# Patient Record
Sex: FEMALE | Race: WHITE | NOT HISPANIC OR LATINO | Employment: FULL TIME | URBAN - METROPOLITAN AREA
[De-identification: names, ages, dates, MRNs, and addresses within clinical notes are randomized per-mention and may not be internally consistent; named-entity substitution may affect disease eponyms.]

---

## 2024-01-16 ENCOUNTER — OFFICE VISIT (OUTPATIENT)
Dept: PRIMARY CARE | Facility: CLINIC | Age: 59
End: 2024-01-16
Payer: COMMERCIAL

## 2024-01-16 VITALS
WEIGHT: 160 LBS | TEMPERATURE: 97.2 F | HEART RATE: 80 BPM | HEIGHT: 62 IN | SYSTOLIC BLOOD PRESSURE: 126 MMHG | DIASTOLIC BLOOD PRESSURE: 76 MMHG | BODY MASS INDEX: 29.44 KG/M2

## 2024-01-16 DIAGNOSIS — C67.9 MALIGNANT NEOPLASM OF URINARY BLADDER, UNSPECIFIED SITE (MULTI): ICD-10-CM

## 2024-01-16 DIAGNOSIS — M25.50 ARTHRALGIA, UNSPECIFIED JOINT: ICD-10-CM

## 2024-01-16 DIAGNOSIS — G47.33 OSA (OBSTRUCTIVE SLEEP APNEA): Primary | ICD-10-CM

## 2024-01-16 DIAGNOSIS — E66.3 OVERWEIGHT (BMI 25.0-29.9): ICD-10-CM

## 2024-01-16 DIAGNOSIS — R73.03 PREDIABETES: ICD-10-CM

## 2024-01-16 DIAGNOSIS — Z00.00 ROUTINE GENERAL MEDICAL EXAMINATION AT A HEALTH CARE FACILITY: ICD-10-CM

## 2024-01-16 DIAGNOSIS — Z13.6 SCREENING FOR HEART DISEASE: ICD-10-CM

## 2024-01-16 DIAGNOSIS — E78.5 HYPERLIPIDEMIA, UNSPECIFIED HYPERLIPIDEMIA TYPE: ICD-10-CM

## 2024-01-16 PROCEDURE — 99214 OFFICE O/P EST MOD 30 MIN: CPT | Performed by: FAMILY MEDICINE

## 2024-01-16 PROCEDURE — 99396 PREV VISIT EST AGE 40-64: CPT | Performed by: FAMILY MEDICINE

## 2024-01-16 ASSESSMENT — ENCOUNTER SYMPTOMS
CONSTIPATION: 0
DIFFICULTY URINATING: 0
POLYDIPSIA: 0
DYSURIA: 0
DIARRHEA: 0
POLYPHAGIA: 0
VOMITING: 0
BLOOD IN STOOL: 0
ABDOMINAL DISTENTION: 0
SLEEP DISTURBANCE: 0
MYALGIAS: 0
ABDOMINAL PAIN: 0
FATIGUE: 0
DYSPHORIC MOOD: 0
NAUSEA: 0
DIZZINESS: 0
HEADACHES: 0
SHORTNESS OF BREATH: 0
PALPITATIONS: 0

## 2024-01-16 NOTE — PROGRESS NOTES
"Subjective   Patient ID: Lynette Calle is a 58 y.o. female who presents for Annual Exam, and multiple issues.     HPI   LFTs/lipids/predm: due for recheck  DEJON: dxd 6 yrs ago. Due for recheck. Never followed up to obtain machine. Reports not restful sleep. \"Never dreams.\"  Bladder CA: due to see urology.   Joint pains: recurrent. Would like repeat RA testing. Pain mostly in feet b/l.   BMI: high. Has been working on diet. Not exercising.     Review of Systems   Constitutional:  Negative for fatigue.   HENT: Negative.     Eyes:  Negative for visual disturbance.   Respiratory:  Negative for shortness of breath.    Cardiovascular:  Negative for chest pain and palpitations.   Gastrointestinal:  Negative for abdominal distention, abdominal pain, blood in stool, constipation, diarrhea, nausea and vomiting.   Endocrine: Negative for cold intolerance, heat intolerance, polydipsia, polyphagia and polyuria.   Genitourinary:  Negative for difficulty urinating and dysuria.   Musculoskeletal:  Negative for myalgias.   Skin:  Negative for rash.   Neurological:  Negative for dizziness and headaches.   Psychiatric/Behavioral:  Negative for dysphoric mood and sleep disturbance.        Objective   /76   Pulse 80   Temp 36.2 °C (97.2 °F)   Ht 1.575 m (5' 2\")   Wt 72.6 kg (160 lb)   BMI 29.26 kg/m²     Physical Exam  Vitals and nursing note reviewed.   Constitutional:       General: She is not in acute distress.     Appearance: Normal appearance. She is not toxic-appearing.   HENT:      Head: Normocephalic.      Right Ear: Tympanic membrane normal.      Left Ear: Tympanic membrane normal.      Nose: Nose normal.      Mouth/Throat:      Pharynx: Oropharynx is clear.   Eyes:      General: No scleral icterus.     Pupils: Pupils are equal, round, and reactive to light.   Neck:      Vascular: No carotid bruit.   Cardiovascular:      Rate and Rhythm: Normal rate and regular rhythm.      Pulses: Normal pulses.      Heart sounds: No " murmur heard.  Pulmonary:      Effort: Pulmonary effort is normal. No respiratory distress.      Breath sounds: Normal breath sounds.   Abdominal:      General: Bowel sounds are normal.      Palpations: Abdomen is soft.      Tenderness: There is no abdominal tenderness. There is no guarding.   Musculoskeletal:         General: No tenderness.      Right lower leg: No edema.      Left lower leg: No edema.   Lymphadenopathy:      Cervical: No cervical adenopathy.   Skin:     General: Skin is warm.   Neurological:      General: No focal deficit present.      Mental Status: She is alert.      Cranial Nerves: No cranial nerve deficit.   Psychiatric:         Mood and Affect: Mood normal.         Assessment/Plan   Problem List Items Addressed This Visit             ICD-10-CM    Bladder cancer (CMS/HCC) C67.9    Relevant Orders    Referral to Urology    Hyperlipidemia E78.5    Relevant Orders    Lipid Panel    Comprehensive Metabolic Panel    TSH with reflex to Free T4 if abnormal    Prediabetes R73.03    Relevant Orders    Hemoglobin A1C     Other Visit Diagnoses         Codes    DEJON (obstructive sleep apnea)    -  Primary G47.33    Relevant Orders    Home sleep apnea test (HSAT)    Arthralgia, unspecified joint     M25.50    Relevant Orders    Rheumatoid factor    Uric Acid    Routine general medical examination at a health care facility     Z00.00    Relevant Orders    CBC and Auto Differential    Screening for heart disease     Z13.6    Relevant Orders    CT cardiac scoring wo IV contrast    Overweight (BMI 25.0-29.9)     E66.3          Discussed prior blood work and wellness issues. Reviewed screenings and immunizations. Recommendations given. Declines vaccines, mammograms.     Recommend pt contact prior GYN for possible vaginal swabs for WWE and h/o cervical dysplasia.

## 2024-01-16 NOTE — PATIENT INSTRUCTIONS
Recommend a predominant low fat whole foods plant based diet.  Cut back on meat, dairy, processed carbs, salt and oils. Increase fiber in your diet.  Decrease alcohol as much as possible if you drink. Recommend regular exercise most days of the week(goal up to 150min per week). Also recommend good sleep habits aiming for 7-8 hours per night.     You were referred for calcium score test, urologist and for blood work    You were referred for sleep study    Return in 6 months, sooner if needed

## 2024-02-13 ENCOUNTER — CLINICAL SUPPORT (OUTPATIENT)
Dept: SLEEP MEDICINE | Facility: HOSPITAL | Age: 59
End: 2024-02-13
Payer: COMMERCIAL

## 2024-02-13 ENCOUNTER — OFFICE VISIT (OUTPATIENT)
Dept: UROLOGY | Facility: CLINIC | Age: 59
End: 2024-02-13
Payer: COMMERCIAL

## 2024-02-13 VITALS — HEIGHT: 62 IN | TEMPERATURE: 97.4 F | BODY MASS INDEX: 29.44 KG/M2 | WEIGHT: 160 LBS

## 2024-02-13 DIAGNOSIS — Z79.2 NEED FOR PROPHYLACTIC ANTIBIOTIC: ICD-10-CM

## 2024-02-13 DIAGNOSIS — G47.33 OSA (OBSTRUCTIVE SLEEP APNEA): ICD-10-CM

## 2024-02-13 DIAGNOSIS — C67.9 MALIGNANT NEOPLASM OF URINARY BLADDER, UNSPECIFIED SITE (MULTI): Primary | ICD-10-CM

## 2024-02-13 LAB
POC APPEARANCE, URINE: CLEAR
POC BILIRUBIN, URINE: NEGATIVE
POC BLOOD, URINE: ABNORMAL
POC COLOR, URINE: YELLOW
POC GLUCOSE, URINE: NEGATIVE MG/DL
POC KETONES, URINE: NEGATIVE MG/DL
POC LEUKOCYTES, URINE: NEGATIVE
POC NITRITE,URINE: NEGATIVE
POC PH, URINE: 6 PH
POC PROTEIN, URINE: NEGATIVE MG/DL
POC SPECIFIC GRAVITY, URINE: 1.01
POC UROBILINOGEN, URINE: 0.2 EU/DL

## 2024-02-13 PROCEDURE — 88112 CYTOPATH CELL ENHANCE TECH: CPT

## 2024-02-13 PROCEDURE — 81002 URINALYSIS NONAUTO W/O SCOPE: CPT | Performed by: UROLOGY

## 2024-02-13 PROCEDURE — 99204 OFFICE O/P NEW MOD 45 MIN: CPT | Performed by: UROLOGY

## 2024-02-13 PROCEDURE — 88112 CYTOPATH CELL ENHANCE TECH: CPT | Performed by: PATHOLOGY

## 2024-02-13 PROCEDURE — 95806 SLEEP STUDY UNATT&RESP EFFT: CPT | Performed by: INTERNAL MEDICINE

## 2024-02-13 RX ORDER — CEPHALEXIN 500 MG/1
500 CAPSULE ORAL ONCE
Qty: 1 CAPSULE | Refills: 0 | Status: SHIPPED | OUTPATIENT
Start: 2024-02-13 | End: 2024-02-13

## 2024-02-13 NOTE — PROGRESS NOTES
59 year old female presents today as a new patient for bladder cancer, kindly self referred. Diagnosed in 2015 with non invasive TCC. Her last cystoscopy was in 2019. She started smoking since her last visit. Patient denies gross hematuria, dysuria, bothersome urinary urgency or frequency. Patient denies a history of kidney stones.        SUBJECTIVE: HPI   TODAY (02/13/24)  Reports that she was lost to follow up due to multiple deaths in the family. She has restarted smoking since her last visit. Denies any gross hematuria or flank pain  no bothersome urinary symptoms    TO REVIEW:      Past medical, surgical, family and social history in the chart was reviewed and is accurate including any additions to what is in this HPI.    Review of Systems   Constitutional: denies any unintentional weight loss or change in strength.  Integumentary: denies any rashes or pruritus.  Eyes: denies any double vision or eye pain.  Ear/Nose/Mouth/Throat: denies any nosebleeds or gum bleeds.  Cardiovascular: denies any chest pain or syncope.  Respiratory: denies hemoptysis.  Gastrointestinal: denies nausea or vomiting.  Musculoskeletal: denies muscle cramping or weakness.  Neurologic: denies convulsions or seizures.  Hematologic/Lymphatic: denies bleeding tendencies.  Endocrine: denies heat/cold intolerance.  All other systems have been reviewed and are negative unless otherwise noted in the HPI.    OBJECTIVE:  Physical Exam   Constitutional: NAD  HEENT: AT/NC  Resp: Non labored respirations.  Skin: No jaundice or visible skin lesions.  Neuro: No focal deficits.  Psych: Appropriate mood and affect.    Labs and imaging:  Lab Results   Component Value Date    WBC 7.9 04/13/2021    HGB 14.9 04/13/2021    HCT 44.9 04/13/2021     04/13/2021    CHOL 189 06/08/2022    TRIG 127 06/08/2022    HDL 46.1 06/08/2022    ALT 11 06/08/2022    AST 11 06/08/2022     06/08/2022    K 4.7 06/08/2022     06/08/2022    CREATININE 0.72  06/08/2022    BUN 16 06/08/2022    CO2 29 06/08/2022    TSH 2.20 04/13/2021    INR 1.0 12/01/2019    HGBA1C 5.9 (A) 06/08/2022     ASSESSMENT:  Problem List Items Addressed This Visit       Bladder cancer (CMS/HCC) - Primary    Relevant Orders    POCT UA (nonautomated) manually resulted (Completed)    Cystourethroscopy    US renal complete    Non-gynecologic cytology     Other Visit Diagnoses       Need for prophylactic antibiotic              1.Bladder cancer  2.  3.    PLAN:  She needs upper tract imaging. We will have her come back for a cystoscopy. Urine was also sent to cytology.       The risks of cystoscopy were discussed with the patient in great detail, including risk of hematuria, UTI and discomfort. Antibiotic will be prescribed to be taken 1 hour prior to the procedure. Patient agrees to proceed.        All questions were answered to the patient’s satisfaction.  Patient agrees with the plan and wishes to proceed.  Follow-up will be scheduled appropriately.     Scribe Attestation  By signing my name below, I, April Greene   attest that this documentation has been prepared under the direction and in the presence of Liz Kennedy MD.

## 2024-02-13 NOTE — PATIENT INSTRUCTIONS
Call [phone] to schedule your ultrasound     Take antibiotic 1 hour prior to cystoscopy.  We will need a urine sample during that appointment, so please arrive with a full enough bladder to leave a sample.  There are no restrictions in medications, eating/drinking, and driving.

## 2024-02-15 LAB
LABORATORY COMMENT REPORT: NORMAL
LABORATORY COMMENT REPORT: NORMAL
PATH REPORT.FINAL DX SPEC: NORMAL
PATH REPORT.GROSS SPEC: NORMAL
PATH REPORT.RELEVANT HX SPEC: NORMAL
PATH REPORT.TOTAL CANCER: NORMAL
RESIDENT REVIEW: NORMAL

## 2024-02-16 ENCOUNTER — APPOINTMENT (OUTPATIENT)
Dept: RADIOLOGY | Facility: CLINIC | Age: 59
End: 2024-02-16
Payer: COMMERCIAL

## 2024-02-17 ENCOUNTER — APPOINTMENT (OUTPATIENT)
Dept: RADIOLOGY | Facility: CLINIC | Age: 59
End: 2024-02-17
Payer: COMMERCIAL

## 2024-03-01 ENCOUNTER — HOSPITAL ENCOUNTER (OUTPATIENT)
Dept: RADIOLOGY | Facility: CLINIC | Age: 59
Discharge: HOME | End: 2024-03-01
Payer: COMMERCIAL

## 2024-03-01 DIAGNOSIS — C67.9 MALIGNANT NEOPLASM OF URINARY BLADDER, UNSPECIFIED SITE (MULTI): ICD-10-CM

## 2024-03-01 PROCEDURE — 76770 US EXAM ABDO BACK WALL COMP: CPT

## 2024-03-01 PROCEDURE — 76770 US EXAM ABDO BACK WALL COMP: CPT | Performed by: STUDENT IN AN ORGANIZED HEALTH CARE EDUCATION/TRAINING PROGRAM

## 2024-03-05 ENCOUNTER — HOSPITAL ENCOUNTER (OUTPATIENT)
Dept: RADIOLOGY | Facility: CLINIC | Age: 59
End: 2024-03-05
Payer: COMMERCIAL

## 2024-03-06 ENCOUNTER — PROCEDURE VISIT (OUTPATIENT)
Dept: UROLOGY | Facility: CLINIC | Age: 59
End: 2024-03-06
Payer: COMMERCIAL

## 2024-03-06 VITALS
SYSTOLIC BLOOD PRESSURE: 145 MMHG | HEIGHT: 62 IN | DIASTOLIC BLOOD PRESSURE: 84 MMHG | WEIGHT: 160 LBS | BODY MASS INDEX: 29.44 KG/M2 | TEMPERATURE: 97.6 F

## 2024-03-06 DIAGNOSIS — C67.9 MALIGNANT NEOPLASM OF URINARY BLADDER, UNSPECIFIED SITE (MULTI): Primary | ICD-10-CM

## 2024-03-06 LAB
POC APPEARANCE, URINE: CLEAR
POC BILIRUBIN, URINE: NEGATIVE
POC BLOOD, URINE: ABNORMAL
POC COLOR, URINE: YELLOW
POC GLUCOSE, URINE: NEGATIVE MG/DL
POC KETONES, URINE: NEGATIVE MG/DL
POC LEUKOCYTES, URINE: ABNORMAL
POC NITRITE,URINE: NEGATIVE
POC PH, URINE: 6 PH
POC PROTEIN, URINE: NEGATIVE MG/DL
POC SPECIFIC GRAVITY, URINE: 1.02
POC UROBILINOGEN, URINE: 0.2 EU/DL

## 2024-03-06 PROCEDURE — 81002 URINALYSIS NONAUTO W/O SCOPE: CPT | Performed by: UROLOGY

## 2024-03-06 PROCEDURE — 52000 CYSTOURETHROSCOPY: CPT | Performed by: UROLOGY

## 2024-03-06 ASSESSMENT — PAIN SCALES - GENERAL: PAINLEVEL: 0-NO PAIN

## 2024-03-06 NOTE — PROGRESS NOTES
Subjective   Lynette Calle is a 59 y.o. female with history of bladder cancer diagnosed in 2015 with non invasive TCC, presents today for cystoscopy. Patient denies gross hematuria, dysuria, bothersome urinary urgency or frequency. Patient denies a history of kidney stones.          No past medical history on file.  No past surgical history on file.  Family History   Problem Relation Name Age of Onset    Hypertension Mother      Stomach cancer Mother      Diabetes Father      Hypertension Father      Coronary artery disease Neg Hx       No current outpatient medications on file.     No current facility-administered medications for this visit.     Allergies   Allergen Reactions    Bupropion Unknown     mood worsening    Ciprofloxacin Other    Duloxetine Unknown     sweating     Social History     Socioeconomic History    Marital status:      Spouse name: Not on file    Number of children: Not on file    Years of education: Not on file    Highest education level: Not on file   Occupational History    Not on file   Tobacco Use    Smoking status: Every Day     Packs/day: 1     Types: Cigarettes     Start date: 1993    Smokeless tobacco: Never   Vaping Use    Vaping Use: Never used   Substance and Sexual Activity    Alcohol use: Not Currently    Drug use: Never    Sexual activity: Not on file   Other Topics Concern    Not on file   Social History Narrative    Not on file     Social Determinants of Health     Financial Resource Strain: Not on file   Food Insecurity: Not on file   Transportation Needs: Not on file   Physical Activity: Not on file   Stress: Not on file   Social Connections: Not on file   Intimate Partner Violence: Not on file   Housing Stability: Not on file       Review of Systems  Pertinent items are noted in HPI.    Objective     PROCEDURE NOTE:    PREOPERATIVE DIAGNOSIS:  Bladder cancer    POSTOPERATIVE DIAGNOSIS:  Same    OPERATION:  Flexible Cystourethroscopy      ANESTHESIA:  2%  lidocaine  "jelly    COMPLICATIONS:  None    EBL: Minimal    SPECIMEN:    DISPOSITION:  The patient was discharged home after the procedure, per routine.    INDICATIONS: :  Ms. Calle is a 59 y.o. patient with a history of Bladder cancer who presents today for Cystoscopy.     The indications, risks and benefits of this procedure were discussed with the patient, consent was obtained prior to the procedure, and to the best of my judgement the patient seemed to understand and agree to the procedure.    PROCEDURE:  The patient  was brought into the procedure suite and informed consent was reviewed and confirmed. Vital signs were obtained prior to the procedure: /84   Temp 36.4 °C (97.6 °F)   Ht 1.575 m (5' 2\")   Wt 72.6 kg (160 lb)   BMI 29.26 kg/m² .   The patient was escorted onto the stretcher, placed supine and froglegged, prepped with betadine and draped in the usual standard surgical fashion.  Intraurethral 2% viscous lidocaine jelly was used for local analgesia.  A 16 Czech flexible cystourethroscope was inserted into the urethra.     Upon entering the bladder the entire bladder was surveyed in a 360 degree fashion.  The left and right ureteral orifices were in normal orthotopic position effluxing clear yellow urine, bilaterally.   There was no evidence of any bladder lesions, foreign objects, stones or evidence of any mucosal changes. The cystoscope was then retroflexed.  The bladder neck was then further examined without any evidence of lesions. The scope was then removed and in an antegrade fashion, the urethra and bladder were again resurveyed with no evidence of additional lesions.  The cystoscope was then fully removed.   The patient tolerated the procedure well.  Vitals were stable after the procedure.  The patient was able to void and was discharged home.  Verbal and written Post procedure instructions were reviewed with the patient.    IMPRESSION:    Cystoscopy today was negative.         Lab Review  Lab " Results   Component Value Date    WBC 7.9 04/13/2021    RBC 4.77 04/13/2021    HGB 14.9 04/13/2021    HCT 44.9 04/13/2021     04/13/2021      Lab Results   Component Value Date    BUN 16 06/08/2022    CREATININE 0.72 06/08/2022        Urine analysis shows +leukocytes, +blood     Assessment/Plan   Diagnoses and all orders for this visit:  Malignant neoplasm of urinary bladder, unspecified site (CMS/HCC)  -     POCT UA (nonautomated) manually resulted    History of bladder cancer     Cystoscopy today was negative.     We will follow up in 1 year for surveillance cystoscopy.     All questions were answered to the patient's satisfaction. Patient agrees with the plan and wishes to proceed. Follow-up will be scheduled appropriately.     Scribed for Dr. Kennedy by Graciela Augustine. I , Dr Kennedy, have personally reviewed and agreed with the information entered by the Virtual Scribe.

## 2024-03-26 ENCOUNTER — TELEPHONE (OUTPATIENT)
Dept: PRIMARY CARE | Facility: CLINIC | Age: 59
End: 2024-03-26
Payer: COMMERCIAL

## 2024-03-26 NOTE — TELEPHONE ENCOUNTER
Pt called rx line @ 9749, she had a sleep apnea test and it showed she does have sleep apnea but she said noone called her about what she is supposed to do next. Please advise/mk

## 2024-03-27 DIAGNOSIS — G47.33 OSA (OBSTRUCTIVE SLEEP APNEA): Primary | ICD-10-CM

## 2024-04-17 ENCOUNTER — APPOINTMENT (OUTPATIENT)
Dept: RADIOLOGY | Facility: HOSPITAL | Age: 59
End: 2024-04-17
Payer: COMMERCIAL

## 2024-04-17 ENCOUNTER — HOSPITAL ENCOUNTER (EMERGENCY)
Facility: HOSPITAL | Age: 59
Discharge: HOME | End: 2024-04-17
Payer: COMMERCIAL

## 2024-04-17 VITALS
HEART RATE: 80 BPM | DIASTOLIC BLOOD PRESSURE: 90 MMHG | BODY MASS INDEX: 29.08 KG/M2 | RESPIRATION RATE: 16 BRPM | SYSTOLIC BLOOD PRESSURE: 146 MMHG | WEIGHT: 158 LBS | HEIGHT: 62 IN | OXYGEN SATURATION: 95 % | TEMPERATURE: 98.6 F

## 2024-04-17 DIAGNOSIS — M79.605 LEFT LEG PAIN: Primary | ICD-10-CM

## 2024-04-17 PROCEDURE — 73630 X-RAY EXAM OF FOOT: CPT | Mod: LEFT SIDE | Performed by: RADIOLOGY

## 2024-04-17 PROCEDURE — 73590 X-RAY EXAM OF LOWER LEG: CPT | Mod: LT

## 2024-04-17 PROCEDURE — 93971 EXTREMITY STUDY: CPT

## 2024-04-17 PROCEDURE — 73630 X-RAY EXAM OF FOOT: CPT | Mod: LT

## 2024-04-17 PROCEDURE — 73610 X-RAY EXAM OF ANKLE: CPT | Mod: LT

## 2024-04-17 PROCEDURE — 73610 X-RAY EXAM OF ANKLE: CPT | Mod: LEFT SIDE | Performed by: RADIOLOGY

## 2024-04-17 PROCEDURE — 93971 EXTREMITY STUDY: CPT | Mod: FOREIGN READ | Performed by: RADIOLOGY

## 2024-04-17 PROCEDURE — 73590 X-RAY EXAM OF LOWER LEG: CPT | Mod: LEFT SIDE | Performed by: RADIOLOGY

## 2024-04-17 PROCEDURE — 99284 EMERGENCY DEPT VISIT MOD MDM: CPT | Mod: 25

## 2024-04-17 RX ORDER — NAPROXEN 500 MG/1
500 TABLET ORAL 2 TIMES DAILY PRN
Qty: 14 TABLET | Refills: 0 | Status: SHIPPED | OUTPATIENT
Start: 2024-04-17 | End: 2024-04-24

## 2024-04-17 ASSESSMENT — PAIN SCALES - GENERAL
PAINLEVEL_OUTOF10: 2
PAINLEVEL_OUTOF10: 5 - MODERATE PAIN

## 2024-04-17 ASSESSMENT — COLUMBIA-SUICIDE SEVERITY RATING SCALE - C-SSRS
1. IN THE PAST MONTH, HAVE YOU WISHED YOU WERE DEAD OR WISHED YOU COULD GO TO SLEEP AND NOT WAKE UP?: NO
2. HAVE YOU ACTUALLY HAD ANY THOUGHTS OF KILLING YOURSELF?: NO
6. HAVE YOU EVER DONE ANYTHING, STARTED TO DO ANYTHING, OR PREPARED TO DO ANYTHING TO END YOUR LIFE?: NO

## 2024-04-17 ASSESSMENT — LIFESTYLE VARIABLES
EVER HAD A DRINK FIRST THING IN THE MORNING TO STEADY YOUR NERVES TO GET RID OF A HANGOVER: NO
EVER FELT BAD OR GUILTY ABOUT YOUR DRINKING: NO
HAVE YOU EVER FELT YOU SHOULD CUT DOWN ON YOUR DRINKING: NO
HAVE PEOPLE ANNOYED YOU BY CRITICIZING YOUR DRINKING: NO
TOTAL SCORE: 0

## 2024-04-17 ASSESSMENT — PAIN DESCRIPTION - ORIENTATION: ORIENTATION: LEFT;LOWER

## 2024-04-17 ASSESSMENT — PAIN - FUNCTIONAL ASSESSMENT: PAIN_FUNCTIONAL_ASSESSMENT: 0-10

## 2024-04-17 ASSESSMENT — PAIN DESCRIPTION - PAIN TYPE: TYPE: ACUTE PAIN

## 2024-04-17 ASSESSMENT — PAIN DESCRIPTION - LOCATION: LOCATION: LEG

## 2024-04-17 NOTE — Clinical Note
Lynette Calle was seen and treated in our emergency department on 4/17/2024.  She may return to work on 04/18/2024.       If you have any questions or concerns, please don't hesitate to call.      Crista Butts, APRN-CNP

## 2024-04-17 NOTE — ED NOTES
Pt OK for d/c home per provider. All results and findings discussed with patient by provider. Home care and follow up instructions provided to patient. All questions answered. Pt verbalized understanding of all information. Pt A&Ox4, resp easy, skin warm dry and of appropriate color. Departs ED with steady gait.      Patricia Greer RN  04/17/24 5195

## 2024-04-17 NOTE — ED PROVIDER NOTES
HPI   Chief Complaint   Patient presents with    Leg Pain     Left leg pain walks with limp for 3 weeks.        59-year-old female with past history of bladder cancer diagnosed in 2015 noninvasive currently in remission with recent cystoscopy outpatient within normal limits presents to the emergency department today for left lower extremity pain.  Patient states that she has been having some left lower extremity pain for the past 3 weeks.  States that she was in [place] when she noticed the discomfort.  It is in the posterior aspect of the left lower extremity.  Has been an aching sensation.  States last night she then jumped up to try and hit a Bee with a towel came down and felt a crack like sensation in the left lower extremity and has been having increased pain since then.  Intermittent pins and needle sensation to the top of the foot.  She has not been taking anything over-the-counter at home for the pain.  States that she likes to take holistic medications.  She has not noticed any swelling redness or warmth.  Denies any fever or chills.  No chest pain or shortness of breath.  No history of clotting disorders.  Pain is a shooting type discomfort.                          [place] Coma Scale Score: 15                  Patient History   No past medical history on file.  No past surgical history on file.  Family History   Problem Relation Name Age of Onset    Hypertension Mother      Stomach cancer Mother      Diabetes Father      Hypertension Father      Coronary artery disease Neg Hx       Social History     Tobacco Use    Smoking status: Every Day     Current packs/day: 1.00     Average packs/day: 1 pack/day for 31.3 years (31.3 ttl pk-yrs)     Types: Cigarettes     Start date: 1993    Smokeless tobacco: Never   Vaping Use    Vaping status: Never Used   Substance Use Topics    Alcohol use: Not Currently    Drug use: Never       Physical Exam   ED Triage Vitals [04/17/24 1053]   Temperature Heart Rate  Respirations BP   37 °C (98.6 °F) 96 18 130/83      Pulse Ox Temp Source Heart Rate Source Patient Position   98 % Temporal Monitor --      BP Location FiO2 (%)     -- --       Physical Exam  Vitals reviewed.   Constitutional:       Appearance: Normal appearance.   HENT:      Head: Normocephalic.   Cardiovascular:      Rate and Rhythm: Normal rate and regular rhythm.   Pulmonary:      Effort: Pulmonary effort is normal.      Breath sounds: Normal breath sounds.   Abdominal:      General: Abdomen is flat.      Palpations: Abdomen is soft.   Musculoskeletal:      Right upper leg: Normal.      Left upper leg: Normal.      Right knee: Normal.      Left knee: Normal.      Left lower leg: Tenderness (posterior aspect with no overlying skin changes) present. No swelling. No edema.      Right ankle: Normal.      Left ankle: No swelling, deformity or ecchymosis. Tenderness present over the lateral malleolus and medial malleolus. Normal range of motion.      Left Achilles Tendon: Normal.      Right foot: Normal.      Left foot: Normal capillary refill. Bony tenderness (Anterior aspect of the foot) present. No swelling. Normal pulse.   Skin:     General: Skin is warm and dry.      Capillary Refill: Capillary refill takes less than 2 seconds.   Neurological:      Mental Status: She is alert.         Labs Reviewed - No data to display  Pain Management Panel          Latest Ref Rng & Units 12/1/2019   Pain Management Panel   Amphetamine Screen, Urine NEGATIVE PRESUMPTIVE NEGATIVE    Barbiturate Screen, Urine NEGATIVE PRESUMPTIVE NEGATIVE    Methadone Screen, Urine NEGATIVE PRESUMPTIVE NEGATIVE      Vascular US lower extremity venous duplex left   Final Result   Left lower extremity deep-venous, duplex-Doppler ultrasound is   unremarkable.    Signed by Omari Hill MD      XR tibia fibula left 2 views   Final Result   No acute bony abnormality in the left foot, ankle, or tibia and   fibula. Prominent degenerative changes of  the first MTP joint. Small   spurs on the posterior and plantar aspects of the calcaneus.   Signed by Raimundo Maciel MD      XR ankle left 3+ views   Final Result   No acute bony abnormality in the left foot, ankle, or tibia and   fibula. Prominent degenerative changes of the first MTP joint. Small   spurs on the posterior and plantar aspects of the calcaneus.   Signed by Raimundo Maciel MD      XR foot left 3+ views   Final Result   No acute bony abnormality in the left foot, ankle, or tibia and   fibula. Prominent degenerative changes of the first MTP joint. Small   spurs on the posterior and plantar aspects of the calcaneus.   Signed by Raimundo Maciel MD          ED Course & MDM   Diagnoses as of 04/17/24 1400   Left leg pain       Medical Decision Making  On initial evaluation patient is well-appearing in the emergency department at this time.  She is declining wanting anything for her discomfort.  Exam of the left lower extremity does show some posterior tenderness.  Pulses are intact.  There is no erythema warmth or swelling.  Compartments are soft.  She has full active and passive range of motion of the knee and ankle.  She does have some medial and lateral malleolus tenderness.  X-ray and ultrasound imaging were obtained x-ray of the left lower extremity is negative as well as the ultrasound of the lower extremity.  I sat down discussed all results in depth with the patient.  Neuropathy versus musculoskeletal pain.  I discussed return precautions and close outpatient follow-up with her.  She verbalizes understanding and agreement with the plan has no further questions or concerns and patient will be discharged home in stable condition with anti-inflammatories and outpatient follow-up with her primary care provider.        Procedure  Procedures      Differential Diagnoses include DVT, fracture, musculoskeletal pain, neuropathy  This is not an exhaustive list of all the diagnosis and therapeutics are considered  during the patient's evaluation for an emergency medical condition.    I discussed the differential, results and discharge plan with the patient and/or family/friend/caregiver if present.  I emphasized the importance of follow-up with the physician I referred them to in the timeframe recommended.  I explained reasons for the patient to return to the Emergency Department. Additional verbal discharge instructions were also given and discussed with the patient to supplement those generated by the EMR. We also discussed medications that were prescribed (if any) including common side effects and interactions. The patient was advised to abstain from driving, operating heavy machinery or making significant decisions while taking medications such as opiates and muscle relaxers that may impair this. All questions were addressed.  They understand return precautions and discharge instructions. The patient and/or family/friend/caregiver expressed understanding.           MILY Lyles-LUMA  04/17/24 1409

## 2024-04-18 ENCOUNTER — OFFICE VISIT (OUTPATIENT)
Dept: PRIMARY CARE | Facility: CLINIC | Age: 59
End: 2024-04-18
Payer: COMMERCIAL

## 2024-04-18 VITALS
WEIGHT: 164 LBS | BODY MASS INDEX: 30 KG/M2 | OXYGEN SATURATION: 98 % | HEART RATE: 76 BPM | SYSTOLIC BLOOD PRESSURE: 118 MMHG | DIASTOLIC BLOOD PRESSURE: 78 MMHG | TEMPERATURE: 97.7 F

## 2024-04-18 DIAGNOSIS — L72.0 MILIA: ICD-10-CM

## 2024-04-18 DIAGNOSIS — M79.605 PAIN OF LEFT LOWER EXTREMITY: Primary | ICD-10-CM

## 2024-04-18 DIAGNOSIS — G47.33 OSA (OBSTRUCTIVE SLEEP APNEA): ICD-10-CM

## 2024-04-18 DIAGNOSIS — L81.9 HYPERPIGMENTATION: ICD-10-CM

## 2024-04-18 DIAGNOSIS — R05.9 COUGH, UNSPECIFIED TYPE: ICD-10-CM

## 2024-04-18 PROCEDURE — 99214 OFFICE O/P EST MOD 30 MIN: CPT | Performed by: FAMILY MEDICINE

## 2024-04-18 RX ORDER — HYDROQUINONE 40 MG/G
CREAM TOPICAL 2 TIMES DAILY
Qty: 28.35 G | Refills: 0 | Status: SHIPPED | OUTPATIENT
Start: 2024-04-18 | End: 2024-07-17

## 2024-04-18 RX ORDER — ADAPALENE AND BENZOYL PEROXIDE GEL, 0.1%/2.5% 1; 25 MG/G; MG/G
GEL TOPICAL
Qty: 45 G | Refills: 1 | Status: SHIPPED | OUTPATIENT
Start: 2024-04-18

## 2024-04-18 RX ORDER — ALBUTEROL SULFATE 90 UG/1
2 AEROSOL, METERED RESPIRATORY (INHALATION) EVERY 4 HOURS PRN
Qty: 8.5 G | Refills: 0 | Status: SHIPPED | OUTPATIENT
Start: 2024-04-18 | End: 2024-05-18

## 2024-04-18 ASSESSMENT — ENCOUNTER SYMPTOMS
APPETITE CHANGE: 0
DIFFICULTY URINATING: 0
RHINORRHEA: 0
DYSURIA: 0
COUGH: 0
NUMBNESS: 0
ABDOMINAL PAIN: 0
WEAKNESS: 0
WHEEZING: 0
MYALGIAS: 1
PALPITATIONS: 0
SORE THROAT: 0
VOMITING: 0
CONSTIPATION: 0
CHILLS: 0
DIZZINESS: 0
ACTIVITY CHANGE: 0
FEVER: 0
DIARRHEA: 0
ARTHRALGIAS: 0
FREQUENCY: 0
SHORTNESS OF BREATH: 0
NAUSEA: 0
LIGHT-HEADEDNESS: 0

## 2024-04-18 NOTE — PROGRESS NOTES
Subjective   Patient ID: Lynette Calle is a 59 y.o. female who presents for Hospital Follow-up ([facility] on 4/17/24 Re: L leg pain) and multiple issues.     HPI   L leg pain  She is here for follow-up for an ED visit for left leg pain.  It started approximately 3 weeks ago when she was in [place] and localizes it to the anterior aspect of her lower leg radiating into her foot.  She describes it as achy in nature.  She is unsure what started the pain but it was worsened when she jumped up and tried to hit a bee with a towel and landed on her left leg.  After that she had difficulty ambulating.  She went to the [facility] ER.  She had x-rays performed which showed arthritis of the first MTP otherwise negative for acute fracture.  She was prescribed naproxen which has been significant improvement in her pain.    Milia/Hyperpigmentation - She has been following with Derm for these issues but is interested in refills for topicals. She is unable to see derm due to financial restraint. They are helping to resolve both her Milia and Hyperpigmentation. No side effects.    Cough: recurrent. Prn albuterol helps. Would like refills    DEJON: sleep study showed severe DEJON. Pt referred to sleep med for possible bipap but has not scheduled.     Review of Systems   Constitutional:  Negative for activity change, appetite change, chills and fever.   HENT:  Negative for congestion, ear pain, postnasal drip, rhinorrhea and sore throat.    Respiratory:  Negative for cough, shortness of breath and wheezing.    Cardiovascular:  Negative for chest pain and palpitations.   Gastrointestinal:  Negative for abdominal pain, constipation, diarrhea, nausea and vomiting.   Genitourinary:  Negative for difficulty urinating, dysuria and frequency.   Musculoskeletal:  Positive for myalgias (left leg pain). Negative for arthralgias.   Skin:  Negative for rash.   Neurological:  Negative for dizziness, weakness, light-headedness and numbness.       Objective   BP  118/78   Pulse 76   Temp 36.5 °C (97.7 °F)   Wt 74.4 kg (164 lb)   SpO2 98%   BMI 30.00 kg/m²     Physical Exam  Vitals and nursing note reviewed.   Constitutional:       General: She is not in acute distress.     Appearance: Normal appearance.   HENT:      Head: Normocephalic and atraumatic.      Nose: No congestion or rhinorrhea.      Mouth/Throat:      Mouth: Mucous membranes are moist.      Pharynx: Oropharynx is clear.   Eyes:      Conjunctiva/sclera: Conjunctivae normal.   Cardiovascular:      Rate and Rhythm: Normal rate and regular rhythm.      Pulses: Normal pulses.      Heart sounds: Normal heart sounds. No murmur heard.  Pulmonary:      Effort: Pulmonary effort is normal. No respiratory distress.      Breath sounds: Normal breath sounds. No wheezing or rhonchi.   Musculoskeletal:         General: Normal range of motion.      Cervical back: Normal range of motion and neck supple. No tenderness.      Right knee: Normal.      Left knee: Effusion present. No swelling, deformity, bony tenderness or crepitus. Normal range of motion. No tenderness.      Right lower leg: Normal. No edema.      Left lower leg: Tenderness (medial mid tibia) present. No swelling. No edema.   Lymphadenopathy:      Cervical: No cervical adenopathy.   Skin:     General: Skin is warm.   Neurological:      General: No focal deficit present.      Mental Status: She is alert and oriented to person, place, and time.   Psychiatric:         Mood and Affect: Mood normal.         Assessment/Plan   Problem List Items Addressed This Visit    None  Visit Diagnoses         Codes    Pain of left lower extremity    -  Primary M79.605    Hyperpigmentation     L81.9    Relevant Medications    hydroquinone 4 % cream    Milia     L72.0    Relevant Medications    adapalene-benzoyl peroxide 0.1-2.5 % gel    DEJON (obstructive sleep apnea)     G47.33    Cough, unspecified type     R05.9    Relevant Medications    albuterol (ProAir HFA) 90 mcg/actuation  inhaler        Continue naproxen for leg pain, PRN  Continue topical medications for Milia and Hyperpigmentation  Follow up for severe sleep apnea  Albuterol inhaler refilled  Follow up in 3 mo, labs ordered    Reviewed ER reports and imaging. Recommendations given. Discussed side effects of topicals and to stop if not helping    I saw and evaluated the patient. I personally obtained the key and critical portions of the history and physical exam or was physically present for key and critical portions performed by the resident/fellow. I reviewed the resident/fellow's documentation and discussed the patient with the resident/fellow. I agree with the resident/fellow's medical decision making as documented in the note. Changes to note made prn    Bella Harvey,

## 2024-04-18 NOTE — PATIENT INSTRUCTIONS
Continue naproxen as needed    Continue topical meds    Recommend follow up with sleep apnea. It is important to treat this    Return in 3 months, sooner if needed

## 2024-05-04 ENCOUNTER — HOSPITAL ENCOUNTER (EMERGENCY)
Facility: HOSPITAL | Age: 59
Discharge: HOME | End: 2024-05-04
Attending: STUDENT IN AN ORGANIZED HEALTH CARE EDUCATION/TRAINING PROGRAM
Payer: COMMERCIAL

## 2024-05-04 ENCOUNTER — APPOINTMENT (OUTPATIENT)
Dept: RADIOLOGY | Facility: HOSPITAL | Age: 59
End: 2024-05-04
Payer: COMMERCIAL

## 2024-05-04 VITALS
DIASTOLIC BLOOD PRESSURE: 78 MMHG | TEMPERATURE: 97.8 F | HEART RATE: 92 BPM | SYSTOLIC BLOOD PRESSURE: 136 MMHG | BODY MASS INDEX: 30.18 KG/M2 | WEIGHT: 164 LBS | RESPIRATION RATE: 18 BRPM | HEIGHT: 62 IN | OXYGEN SATURATION: 96 %

## 2024-05-04 DIAGNOSIS — M17.12 OSTEOARTHRITIS OF LEFT KNEE, UNSPECIFIED OSTEOARTHRITIS TYPE: ICD-10-CM

## 2024-05-04 DIAGNOSIS — M25.462 KNEE EFFUSION, LEFT: Primary | ICD-10-CM

## 2024-05-04 PROCEDURE — 2500000005 HC RX 250 GENERAL PHARMACY W/O HCPCS: Performed by: NURSE PRACTITIONER

## 2024-05-04 PROCEDURE — 73564 X-RAY EXAM KNEE 4 OR MORE: CPT | Mod: LT

## 2024-05-04 PROCEDURE — 73564 X-RAY EXAM KNEE 4 OR MORE: CPT | Mod: LEFT SIDE | Performed by: RADIOLOGY

## 2024-05-04 PROCEDURE — 99283 EMERGENCY DEPT VISIT LOW MDM: CPT

## 2024-05-04 RX ORDER — LIDOCAINE 560 MG/1
1 PATCH PERCUTANEOUS; TOPICAL; TRANSDERMAL ONCE
Status: DISCONTINUED | OUTPATIENT
Start: 2024-05-04 | End: 2024-05-04 | Stop reason: HOSPADM

## 2024-05-04 RX ORDER — ACETAMINOPHEN 325 MG/1
975 TABLET ORAL ONCE
Status: COMPLETED | OUTPATIENT
Start: 2024-05-04 | End: 2024-05-04

## 2024-05-04 RX ORDER — ACETAMINOPHEN 500 MG
1000 TABLET ORAL EVERY 6 HOURS PRN
Qty: 40 TABLET | Refills: 0 | Status: SHIPPED | OUTPATIENT
Start: 2024-05-04 | End: 2024-05-09

## 2024-05-04 RX ORDER — LIDOCAINE 560 MG/1
1 PATCH PERCUTANEOUS; TOPICAL; TRANSDERMAL DAILY
Qty: 7 PATCH | Refills: 0 | Status: SHIPPED | OUTPATIENT
Start: 2024-05-04

## 2024-05-04 RX ADMIN — LIDOCAINE 4% 1 PATCH: 40 PATCH TOPICAL at 16:28

## 2024-05-04 RX ADMIN — ACETAMINOPHEN 975 MG: 325 TABLET ORAL at 16:28

## 2024-05-04 ASSESSMENT — COLUMBIA-SUICIDE SEVERITY RATING SCALE - C-SSRS
1. IN THE PAST MONTH, HAVE YOU WISHED YOU WERE DEAD OR WISHED YOU COULD GO TO SLEEP AND NOT WAKE UP?: NO
6. HAVE YOU EVER DONE ANYTHING, STARTED TO DO ANYTHING, OR PREPARED TO DO ANYTHING TO END YOUR LIFE?: NO
2. HAVE YOU ACTUALLY HAD ANY THOUGHTS OF KILLING YOURSELF?: NO

## 2024-05-04 ASSESSMENT — PAIN SCALES - GENERAL: PAINLEVEL_OUTOF10: 7

## 2024-05-04 ASSESSMENT — PAIN DESCRIPTION - PAIN TYPE: TYPE: ACUTE PAIN

## 2024-05-04 ASSESSMENT — LIFESTYLE VARIABLES
HAVE YOU EVER FELT YOU SHOULD CUT DOWN ON YOUR DRINKING: NO
EVER HAD A DRINK FIRST THING IN THE MORNING TO STEADY YOUR NERVES TO GET RID OF A HANGOVER: NO
HAVE PEOPLE ANNOYED YOU BY CRITICIZING YOUR DRINKING: NO
EVER FELT BAD OR GUILTY ABOUT YOUR DRINKING: NO
TOTAL SCORE: 0

## 2024-05-04 ASSESSMENT — PAIN DESCRIPTION - ORIENTATION: ORIENTATION: LEFT

## 2024-05-04 ASSESSMENT — VISUAL ACUITY: OU: 1

## 2024-05-04 ASSESSMENT — PAIN - FUNCTIONAL ASSESSMENT: PAIN_FUNCTIONAL_ASSESSMENT: 0-10

## 2024-05-04 ASSESSMENT — PAIN DESCRIPTION - DESCRIPTORS: DESCRIPTORS: SHARP;STABBING

## 2024-05-04 ASSESSMENT — PAIN DESCRIPTION - LOCATION: LOCATION: KNEE

## 2024-05-04 NOTE — ED PROVIDER NOTES
HPI   Chief Complaint   Patient presents with    Knee Pain     L knee pain started two weeks ago, continuing since but pt states after gardening, pain has worsened. Concerned for arthritis.        Patient presents to the emergency department for persistent left knee pain after her ER visit on April 17, 2024.  Patient has concern for arthritis hence her ER visit today.  She took the course of naproxen with significant improvement in her symptoms.  She continues to have discomfort mostly along the lateral aspect of the knee that is exacerbated by bearing weight.  She has not had any significant swelling, skin discoloration, open wound, localized foot/ankle edema, posterior leg pain, chest pain, shortness of breath, hemoptysis or inability to perform ADLs.  She denies a history of fracture, dislocation or surgical intervention of this area in the past.  She did follow-up with her primary care provider the day after her ER visit in which she was encouraged to continue the course of naproxen.  Since stopping the medication her pain has worsened hence her ER visit today.  Patient does have a history of bladder cancer and is concerned about restarting NSAIDs and is questioning rheumatoid arthritis as she is to have blood work drawn as ordered by her primary care provider.  She is wondering if she should have an MRI for her knee given her symptoms.  Her symptoms are moderate in severity and persistent nature.      History provided by:  Patient   used: No                          Murali Coma Scale Score: 15                  Patient History   History reviewed. No pertinent past medical history.  History reviewed. No pertinent surgical history.  Family History   Problem Relation Name Age of Onset    Hypertension Mother      Stomach cancer Mother      Diabetes Father      Hypertension Father      Coronary artery disease Neg Hx       Social History     Tobacco Use    Smoking status: Every Day     Current  "packs/day: 1.00     Average packs/day: 1 pack/day for 31.3 years (31.3 ttl pk-yrs)     Types: Cigarettes     Start date: 1993    Smokeless tobacco: Never   Vaping Use    Vaping status: Never Used   Substance Use Topics    Alcohol use: Not Currently    Drug use: Never       Physical Exam   Visit Vitals  /78 (BP Location: Left arm, Patient Position: Sitting)   Pulse 92   Temp 36.6 °C (97.8 °F) (Temporal)   Resp 18   Ht 1.575 m (5' 2\")   Wt 74.4 kg (164 lb)   SpO2 96%   BMI 30.00 kg/m²   OB Status Postmenopausal   Smoking Status Every Day   BSA 1.8 m²      Physical Exam  Vitals reviewed.   Constitutional:       Appearance: Normal appearance.   HENT:      Head: Normocephalic and atraumatic.      Right Ear: Hearing normal.      Left Ear: Hearing normal.      Nose: Nose normal.      Mouth/Throat:      Lips: Pink.      Mouth: Mucous membranes are moist.   Eyes:      General: Vision grossly intact.   Cardiovascular:      Rate and Rhythm: Normal rate and regular rhythm.      Pulses:           Posterior tibial pulses are 2+ on the left side.      Comments: No calf tenderness, palpable cords or localized foot/ankle edema bilaterally.    Pulmonary:      Effort: Pulmonary effort is normal.      Breath sounds: Normal breath sounds.   Musculoskeletal:      Cervical back: Normal range of motion and neck supple.      Right lower leg: No edema.      Left lower leg: No edema.      Comments: There is no tenderness to the left hip, thigh, proximal tib-fib, tibia, ankle or foot.  There is no palpable posterior leg tenderness to the left leg.  Negative anterior and posterior drawer of the knee.  Negative valgus and varus stress test laxity however she does have pain to the lateral aspect of the knee with valgus stress not varus.  She does not have pain with manipulation of the patella and no crepitus or deformity.  Pain is increased with full weightbearing in which she ambulates without an assistive device.  Neurovascularly intact " and compartments are all soft with no open wound, ecchymosis or erythema. Normal capillary refill.    Skin:     General: Skin is warm and dry.      Capillary Refill: Capillary refill takes less than 2 seconds.      Findings: No ecchymosis, erythema, rash or wound.   Neurological:      Mental Status: She is alert and oriented to person, place, and time.         XR knee left 4+ views   Final Result   No acute fracture or dislocation in the left knee.        Mild tricompartmental osteoarthritis.        Small knee joint effusion             MACRO:   None        Signed by: Pedro Alcaraz 5/4/2024 5:21 PM   Dictation workstation:   [id number]          Labs Reviewed - No data to display      ED Course & MDM   ED Course as of 05/04/24 1800   Sat May 04, 2024   1753 Provided x-ray results and discussed outpatient management plan.  [NA]      ED Course User Index  [NA] Emily Saenz, MILY-CNP         Diagnoses as of 05/04/24 1800   Knee effusion, left   Osteoarthritis of left knee, unspecified osteoarthritis type           Medical Decision Making  Patient presents to the ED for evaluation of persistent left knee pain after being evaluated for the same on April 17.  Patient evaluated by Dr. Delgado as well.  Patient is wondering if she can have her outpatient labs drawn today as the patient has concern for rheumatoid arthritis however there are fasting labs ordered.  Patient is not fasting therefore she will have these drawn at another time.  She is interested in a dedicated knee film after we reviewed her x-rays completed on April 17 in which she had a negative tib-fib, ankle and foot x-rays as well as ultrasound for DVT.  Clinically she does not appear to have signs and symptoms indicative of DVT and patient agrees.  She is neurovascularly intact.  Plan is for avoidance of NSAIDs given her history of bladder cancer, topical agents, ice, elevation, Tylenol ES and referral to orthopedics. Patient is amenable to this plan.      Imaging as interpreted by radiologist positive for small effusion and osteoarthritis as documented above. Plan is subsequently for symptom control with Tylenol extra strength, Lidoderm patch, patient will obtain a knee compression sleeve and was given a prescription for a cane as requested.  She is encouraged to follow-up with orthopedics as provided on their discharge handout. Patient is educated on S/S to watch for indicative of re-evaluation in the ER setting including worsening of current symptoms not responding to the treatment plan. Patient verbalized understanding of instructions and is amenable to this treatment plan. Patient departed in stable condition with no social determinants of health that would obscure this outpatient management plan.           Your medication list        START taking these medications        Instructions Last Dose Given Next Dose Due   acetaminophen 500 mg tablet  Commonly known as: Tylenol      Take 2 tablets (1,000 mg) by mouth every 6 hours if needed (PAIN/FEVER) for up to 5 days. MAXIMUM DOSING OF TYLENOL IN A 24 HOUR PERIOD IS 4000 MG       lidocaine 4 % patch      Place 1 patch over 12 hours on the skin once daily. Place on area of maximum pain. Remove & discard patch within 12 hours.       miscellaneous medical supply misc      Dispense 1 quad cane with rubber tips to be used as needed for pain with ambulation DX: osteoarthritis left knee              ASK your doctor about these medications        Instructions Last Dose Given Next Dose Due   adapalene-benzoyl peroxide 0.1-2.5 % gel      Apply daily       albuterol 90 mcg/actuation inhaler  Commonly known as: ProAir HFA      Inhale 2 puffs every 4 hours if needed for wheezing or shortness of breath.       hydroquinone 4 % cream      Apply topically 2 times a day.                 Where to Get Your Medications        These medications were sent to [facility]/pharmacy #[id number] - [place], [place] - [address]  [address]  [id number]      Phone: [phone]   acetaminophen 500 mg tablet  lidocaine 4 % patch       You can get these medications from any pharmacy    Bring a paper prescription for each of these medications  miscellaneous medical supply misc         Procedure  None     *This report was transcribed using voice recognition software.  Every effort was made to ensure accuracy; however, inadvertent computerized transcription errors may be present.*  MILY Stringer-LUMA  05/04/24         MILY Stringer-LUMA  05/04/24 1800

## 2024-05-04 NOTE — Clinical Note
Lynette Calle was seen and treated in our emergency department on 5/4/2024.  She may return to work on 05/08/2024.       If you have any questions or concerns, please don't hesitate to call.      Emily Saenz, APRN-CNP

## 2024-05-06 ENCOUNTER — OFFICE VISIT (OUTPATIENT)
Dept: PRIMARY CARE | Facility: CLINIC | Age: 59
End: 2024-05-06
Payer: COMMERCIAL

## 2024-05-06 VITALS
HEART RATE: 83 BPM | SYSTOLIC BLOOD PRESSURE: 124 MMHG | TEMPERATURE: 97.6 F | OXYGEN SATURATION: 98 % | WEIGHT: 164 LBS | DIASTOLIC BLOOD PRESSURE: 82 MMHG | BODY MASS INDEX: 30 KG/M2

## 2024-05-06 DIAGNOSIS — M25.562 LEFT KNEE PAIN, UNSPECIFIED CHRONICITY: Primary | ICD-10-CM

## 2024-05-06 PROCEDURE — 99214 OFFICE O/P EST MOD 30 MIN: CPT | Performed by: FAMILY MEDICINE

## 2024-05-06 ASSESSMENT — ENCOUNTER SYMPTOMS
CONSTIPATION: 0
MYALGIAS: 0
DIZZINESS: 0
DIARRHEA: 0
SHORTNESS OF BREATH: 0
PALPITATIONS: 0
HEADACHES: 0
POLYDIPSIA: 0
DYSPHORIC MOOD: 0
SLEEP DISTURBANCE: 0
NAUSEA: 0
ABDOMINAL PAIN: 0
POLYPHAGIA: 0
VOMITING: 0
FATIGUE: 0

## 2024-05-06 ASSESSMENT — PATIENT HEALTH QUESTIONNAIRE - PHQ9
1. LITTLE INTEREST OR PLEASURE IN DOING THINGS: NOT AT ALL
SUM OF ALL RESPONSES TO PHQ9 QUESTIONS 1 AND 2: 0
2. FEELING DOWN, DEPRESSED OR HOPELESS: NOT AT ALL

## 2024-05-06 NOTE — PATIENT INSTRUCTIONS
Follow up with your specialists as scheduled    Try to elevate and use ice as needed. Also recommend compression    Return as scheduled, sooner if needed

## 2024-05-06 NOTE — LETTER
May 7, 2024     Patient: Lynette Calle   YOB: 1965   Date of Visit: 5/6/2024       To Whom It May Concern:    Lynette Calle was seen in my clinic on 5/6/2024 at 2:10 pm. Please excuse Lynette for her absence from work on this day to make the appointment. 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     If you have any questions or concerns, please don't hesitate to call.         Sincerely,         Bella Harvey,         CC: No Recipients

## 2024-05-06 NOTE — PROGRESS NOTES
Subjective   Patient ID: Lynette Calle is a 59 y.o. female who presents for Hospital Follow-up ([facility] on 5[facility]24 Re: Knee Pain) and multiple issues.     HPI   Knee pain: persistent. Had another episode of severe pain left knee. Xray showed mild arthritis and fluid. Has apt in 2 days w/ ortho. Given naproxen but not taking-concerned about side effects.  Has not obtained blood work from last appointment.  Patient states she is concerned about rheumatoid.  Knee pain 7 out of 10 on average.  Using friend's cane.  Has new Rx and plans to use since helps.    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   Eyes:  Negative for visual disturbance.   Respiratory:  Negative for shortness of breath.    Cardiovascular:  Negative for chest pain and palpitations.   Gastrointestinal:  Negative for abdominal pain, constipation, diarrhea, nausea and vomiting.   Endocrine: Negative for polydipsia, polyphagia and polyuria.   Musculoskeletal:  Negative for myalgias.   Skin:  Negative for rash.   Neurological:  Negative for dizziness and headaches.   Psychiatric/Behavioral:  Negative for dysphoric mood and sleep disturbance.        Objective   /82   Pulse 83   Temp 36.4 °C (97.6 °F)   Wt 74.4 kg (164 lb)   SpO2 98%   BMI 30.00 kg/m²     Physical Exam  Vitals and nursing note reviewed.   Constitutional:       General: She is not in acute distress.     Appearance: Normal appearance. She is not toxic-appearing.   HENT:      Head: Normocephalic.   Eyes:      Pupils: Pupils are equal, round, and reactive to light.   Cardiovascular:      Rate and Rhythm: Normal rate and regular rhythm.      Pulses: Normal pulses.      Heart sounds: No murmur heard.  Pulmonary:      Effort: Pulmonary effort is normal. No respiratory distress.      Breath sounds: Normal breath sounds.   Musculoskeletal:         General: No  tenderness.      Right lower leg: No edema.      Left lower leg: No edema.      Comments: FROM left knee extension but pain w/ full extension. No obvious laxity. Mild joint line TTP medially and laterally. Mild suprapatellar edema.    Skin:     General: Skin is warm.   Neurological:      General: No focal deficit present.      Mental Status: She is alert.      Cranial Nerves: No cranial nerve deficit.      Gait: Gait abnormal (antalgic).   Psychiatric:         Mood and Affect: Mood normal.         Assessment/Plan   Problem List Items Addressed This Visit    None  Visit Diagnoses         Codes    Left knee pain, unspecified chronicity    -  Primary M25.562          Reviewed ER reports and imaging. Recommendations given.  Discussed okay to take NSAID as long as taking with food and water.

## 2024-05-07 ENCOUNTER — LAB (OUTPATIENT)
Dept: LAB | Facility: LAB | Age: 59
End: 2024-05-07
Payer: COMMERCIAL

## 2024-05-07 DIAGNOSIS — M25.50 ARTHRALGIA, UNSPECIFIED JOINT: ICD-10-CM

## 2024-05-07 DIAGNOSIS — Z00.00 ROUTINE GENERAL MEDICAL EXAMINATION AT A HEALTH CARE FACILITY: ICD-10-CM

## 2024-05-07 DIAGNOSIS — E78.5 HYPERLIPIDEMIA, UNSPECIFIED HYPERLIPIDEMIA TYPE: ICD-10-CM

## 2024-05-07 DIAGNOSIS — R73.03 PREDIABETES: ICD-10-CM

## 2024-05-07 LAB
ALBUMIN SERPL BCP-MCNC: 4.5 G/DL (ref 3.4–5)
ALP SERPL-CCNC: 57 U/L (ref 33–136)
ALT SERPL W P-5'-P-CCNC: 12 U/L (ref 7–45)
ANION GAP SERPL CALC-SCNC: 9 MMOL/L (ref 10–20)
AST SERPL W P-5'-P-CCNC: 15 U/L (ref 9–39)
BASOPHILS # BLD AUTO: 0.03 X10*3/UL (ref 0–0.1)
BASOPHILS NFR BLD AUTO: 0.5 %
BILIRUB SERPL-MCNC: 0.5 MG/DL (ref 0–1.2)
BUN SERPL-MCNC: 12 MG/DL (ref 6–23)
CALCIUM SERPL-MCNC: 10.1 MG/DL (ref 8.6–10.3)
CHLORIDE SERPL-SCNC: 105 MMOL/L (ref 98–107)
CHOLEST SERPL-MCNC: 232 MG/DL (ref 0–199)
CHOLESTEROL/HDL RATIO: 4.6
CO2 SERPL-SCNC: 28 MMOL/L (ref 21–32)
CREAT SERPL-MCNC: 0.63 MG/DL (ref 0.5–1.05)
EGFRCR SERPLBLD CKD-EPI 2021: >90 ML/MIN/1.73M*2
EOSINOPHIL # BLD AUTO: 0.21 X10*3/UL (ref 0–0.7)
EOSINOPHIL NFR BLD AUTO: 3.5 %
ERYTHROCYTE [DISTWIDTH] IN BLOOD BY AUTOMATED COUNT: 13.4 % (ref 11.5–14.5)
GLUCOSE SERPL-MCNC: 93 MG/DL (ref 74–99)
HCT VFR BLD AUTO: 44.4 % (ref 36–46)
HDLC SERPL-MCNC: 49.9 MG/DL
HGB BLD-MCNC: 14.5 G/DL (ref 12–16)
IMM GRANULOCYTES # BLD AUTO: 0.01 X10*3/UL (ref 0–0.7)
IMM GRANULOCYTES NFR BLD AUTO: 0.2 % (ref 0–0.9)
LDLC SERPL CALC-MCNC: 145 MG/DL
LYMPHOCYTES # BLD AUTO: 1.94 X10*3/UL (ref 1.2–4.8)
LYMPHOCYTES NFR BLD AUTO: 32.7 %
MCH RBC QN AUTO: 30.3 PG (ref 26–34)
MCHC RBC AUTO-ENTMCNC: 32.7 G/DL (ref 32–36)
MCV RBC AUTO: 93 FL (ref 80–100)
MONOCYTES # BLD AUTO: 0.28 X10*3/UL (ref 0.1–1)
MONOCYTES NFR BLD AUTO: 4.7 %
NEUTROPHILS # BLD AUTO: 3.47 X10*3/UL (ref 1.2–7.7)
NEUTROPHILS NFR BLD AUTO: 58.4 %
NON HDL CHOLESTEROL: 182 MG/DL (ref 0–149)
NRBC BLD-RTO: 0 /100 WBCS (ref 0–0)
PLATELET # BLD AUTO: 199 X10*3/UL (ref 150–450)
POTASSIUM SERPL-SCNC: 4.4 MMOL/L (ref 3.5–5.3)
PROT SERPL-MCNC: 6.6 G/DL (ref 6.4–8.2)
RBC # BLD AUTO: 4.79 X10*6/UL (ref 4–5.2)
SODIUM SERPL-SCNC: 138 MMOL/L (ref 136–145)
TRIGL SERPL-MCNC: 186 MG/DL (ref 0–149)
TSH SERPL-ACNC: 2.88 MIU/L (ref 0.44–3.98)
URATE SERPL-MCNC: 6 MG/DL (ref 2.3–6.7)
VLDL: 37 MG/DL (ref 0–40)
WBC # BLD AUTO: 5.9 X10*3/UL (ref 4.4–11.3)

## 2024-05-07 PROCEDURE — 36415 COLL VENOUS BLD VENIPUNCTURE: CPT

## 2024-05-07 PROCEDURE — 83036 HEMOGLOBIN GLYCOSYLATED A1C: CPT

## 2024-05-07 PROCEDURE — 80053 COMPREHEN METABOLIC PANEL: CPT

## 2024-05-07 PROCEDURE — 85025 COMPLETE CBC W/AUTO DIFF WBC: CPT

## 2024-05-07 PROCEDURE — 80061 LIPID PANEL: CPT

## 2024-05-07 PROCEDURE — 86431 RHEUMATOID FACTOR QUANT: CPT

## 2024-05-07 PROCEDURE — 84550 ASSAY OF BLOOD/URIC ACID: CPT

## 2024-05-07 PROCEDURE — 84443 ASSAY THYROID STIM HORMONE: CPT

## 2024-05-08 LAB
EST. AVERAGE GLUCOSE BLD GHB EST-MCNC: 120 MG/DL
HBA1C MFR BLD: 5.8 %
RHEUMATOID FACT SER NEPH-ACNC: <10 IU/ML (ref 0–15)

## 2024-05-09 ENCOUNTER — HOSPITAL ENCOUNTER (OUTPATIENT)
Dept: RADIOLOGY | Facility: HOSPITAL | Age: 59
Discharge: HOME | End: 2024-05-09
Payer: COMMERCIAL

## 2024-05-09 ENCOUNTER — OFFICE VISIT (OUTPATIENT)
Dept: ORTHOPEDIC SURGERY | Facility: HOSPITAL | Age: 59
End: 2024-05-09
Payer: COMMERCIAL

## 2024-05-09 VITALS — WEIGHT: 164 LBS | HEIGHT: 62 IN | BODY MASS INDEX: 30.18 KG/M2

## 2024-05-09 DIAGNOSIS — M25.562 ACUTE PAIN OF LEFT KNEE: ICD-10-CM

## 2024-05-09 DIAGNOSIS — M17.12 PRIMARY OSTEOARTHRITIS OF LEFT KNEE: Primary | ICD-10-CM

## 2024-05-09 PROCEDURE — 73560 X-RAY EXAM OF KNEE 1 OR 2: CPT | Mod: LEFT SIDE | Performed by: RADIOLOGY

## 2024-05-09 PROCEDURE — 99204 OFFICE O/P NEW MOD 45 MIN: CPT | Performed by: ORTHOPAEDIC SURGERY

## 2024-05-09 PROCEDURE — 2500000004 HC RX 250 GENERAL PHARMACY W/ HCPCS (ALT 636 FOR OP/ED): Performed by: ORTHOPAEDIC SURGERY

## 2024-05-09 PROCEDURE — 73560 X-RAY EXAM OF KNEE 1 OR 2: CPT | Mod: LT

## 2024-05-09 PROCEDURE — 99214 OFFICE O/P EST MOD 30 MIN: CPT | Performed by: ORTHOPAEDIC SURGERY

## 2024-05-09 PROCEDURE — 20610 DRAIN/INJ JOINT/BURSA W/O US: CPT | Mod: LT | Performed by: ORTHOPAEDIC SURGERY

## 2024-05-09 RX ORDER — TRIAMCINOLONE ACETONIDE 40 MG/ML
80 INJECTION, SUSPENSION INTRA-ARTICULAR; INTRAMUSCULAR
Status: COMPLETED | OUTPATIENT
Start: 2024-05-09 | End: 2024-05-09

## 2024-05-09 RX ADMIN — TRIAMCINOLONE ACETONIDE 80 MG: 40 INJECTION, SUSPENSION INTRA-ARTICULAR; INTRAMUSCULAR at 13:38

## 2024-05-09 NOTE — LETTER
May 9, 2024     Patient: Lynette Calle   YOB: 1965   Date of Visit: 5/9/2024       To Whom It May Concern:    It is my medical opinion that Lynette Calle may return to work on 05/13/2024 .    If you have any questions or concerns, please don't hesitate to call.         Sincerely,        Mahamed Whitley DO    CC: No Recipients

## 2024-05-09 NOTE — PROGRESS NOTES
PRIMARY CARE PHYSICIAN: Bella Harvey DO  REFERRING PROVIDER: Emergency Room    CONSULT ORTHOPAEDIC: Knee Evaluation        ASSESSMENT & PLAN    IMPRESSION:   Primary osteoarthritis, left knee  Likely degenerative medial meniscus tear, left knee    PLAN:   Discussed with patient findings above.  Reviewed x-rays with her.  She does have mild to mod arthritic medial compartment changes that are isolated in nature.  She does have some symptoms that might be secondary to degenerative medial meniscus tear but she is responded slowly to conservative care with using a cane and anti-inflammatories.  Given she still not back to her baseline we discussed the option of trying a corticosteroid injection for pain control along with a knee conditioning program to work on lower extremity strength and stretching.  If this fails to improve her symptoms may get an MRI for further evaluation but discussed with her that most patients who have mild to mod arthritis along with a torn medial meniscus improve at 6 weeks the same as patient is to undergo surgical intervention.  Patient is agreeable to trying conservative management this time.  We will try corticosteroid injection and may continue anti-inflammatories as needed.  See injection details below.  Knee conditioning guide was provided to the patient.  May repeat injection in 3 to 4 months as needed.  Postinjection instructions verbally given.    L Inj/Asp: L knee on 5/9/2024 1:38 PM  Indications: pain  Details: 25 G needle (Inferolateral) approach  Medications: 80 mg triamcinolone acetonide 40 mg/mL    Prepped with alcohol. Patient tolerated injection well. Band-aid applied to injection site.   Procedure, treatment alternatives, risks and benefits explained, specific risks discussed. Consent was given by the patient. Immediately prior to procedure a time out was called to verify the correct patient, procedure, equipment, support staff and site/side marked as required.          SUBJECTIVE  CHIEF COMPLAINT:   Chief Complaint   Patient presents with    Left Knee - Pain        HPI: Lynette Calle is a 59 y.o. patient. Lynette Calle has had progressive problems with theirleft knee over the past 1 month. They do report any trauma. But does report that she was gardening and felt like something crunched in her knee when jumping on the shovel. They do not report any constant or progressive numbness or tingling in their legs. Their symptoms are interfering with activities which include walking and working. States that she occasionally gets symptoms of her knee feels like it locks up.  Patient was using a cane up until yesterday.    FUNCTIONAL STATUS: occasionally limited.  AMBULATORY STATUS: Independent community ambulation without devices  PREVIOUS TREATMENTS: NSAIDS Naproxen with mild improvement  Bracing: OTC brace with mild improvement  HISTORY OF SURGERY ON AFFECTED KNEE(S): No       REVIEW OF SYSTEMS  Constitutional: See HPI for pain assessment, No significant weight loss, recent trauma  Cardiovascular: No chest pain, shortness of breath  Respiratory: No difficulty breathing, cough  Gastrointestinal: No nausea, vomiting, diarrhea, constipation  Musculoskeletal: Noted in HPI, positive for pain, restricted motion, stiffness  Integumentary: No rashes, easy bruising, redness   Neurological: no numbness or tingling in extremities, no gait disturbances   Psychiatric: No mood changes, memory changes, social issues  Heme/Lymph: no excessive swelling, easy bruising, excessive bleeding  ENT: No hearing changes  Eyes: No vision changes    No past medical history on file.     Allergies   Allergen Reactions    Bupropion Unknown     mood worsening    Ciprofloxacin Other    Duloxetine Unknown     sweating        Past Surgical History:   Procedure Laterality Date    BREAST SURGERY       SECTION, CLASSIC      LAPAROSCOPIC HYSTERECTOMY          Family History   Problem Relation Name Age of Onset     Hypertension Mother      Stomach cancer Mother      Diabetes Father      Hypertension Father      Coronary artery disease Neg Hx          Social History     Socioeconomic History    Marital status:      Spouse name: Not on file    Number of children: Not on file    Years of education: Not on file    Highest education level: Not on file   Occupational History    Not on file   Tobacco Use    Smoking status: Every Day     Current packs/day: 1.00     Average packs/day: 1 pack/day for 31.4 years (31.4 ttl pk-yrs)     Types: Cigarettes     Start date: 1993    Smokeless tobacco: Never   Vaping Use    Vaping status: Never Used   Substance and Sexual Activity    Alcohol use: Not Currently    Drug use: Never    Sexual activity: Not on file   Other Topics Concern    Not on file   Social History Narrative    Not on file     Social Determinants of Health     Financial Resource Strain: Not on file   Food Insecurity: Not on file   Transportation Needs: Not on file   Physical Activity: Not on file   Stress: Not on file   Social Connections: Not on file   Intimate Partner Violence: Not on file   Housing Stability: Not on file        CURRENT MEDICATIONS:   Current Outpatient Medications   Medication Sig Dispense Refill    acetaminophen (Tylenol) 500 mg tablet Take 2 tablets (1,000 mg) by mouth every 6 hours if needed (PAIN/FEVER) for up to 5 days. MAXIMUM DOSING OF TYLENOL IN A 24 HOUR PERIOD IS 4000 MG (Patient not taking: Reported on 5/9/2024) 40 tablet 0    adapalene-benzoyl peroxide 0.1-2.5 % gel Apply daily 45 g 1    albuterol (ProAir HFA) 90 mcg/actuation inhaler Inhale 2 puffs every 4 hours if needed for wheezing or shortness of breath. 8.5 g 0    B complex-vitamin C-folic acid (Nephro-Corey Rx) 1- mg-mg-mcg tablet Take 1 tablet by mouth once daily with breakfast.      FLAXSEED OIL ORAL Take by mouth.      hydroquinone 4 % cream Apply topically 2 times a day. 28.35 g 0    lidocaine 4 % patch Place 1 patch over 12  "hours on the skin once daily. Place on area of maximum pain. Remove & discard patch within 12 hours. 7 patch 0    miscellaneous medical supply [facility] Dispense 1 quad cane with rubber tips to be used as needed for pain with ambulation DX: osteoarthritis left knee 1 each 0     No current facility-administered medications for this visit.        OBJECTIVE    PHYSICAL EXAM      3/6/2024     1:54 PM 4/17/2024    10:53 AM 4/17/2024     1:18 PM 4/18/2024     9:21 AM 5/4/2024     3:59 PM 5/6/2024     2:12 PM 5/9/2024    12:49 PM   Vitals   Systolic 145 130 146 118 136 124    Diastolic 84 83 90 78 78 82    Heart Rate  96 80 76 92 83    Temp 36.4 °C (97.6 °F) 37 °C (98.6 °F)  36.5 °C (97.7 °F) 36.6 °C (97.8 °F) 36.4 °C (97.6 °F)    Resp  18 16  18     Height (in) 1.575 m (5' 2\") 1.575 m (5' 2\")   1.575 m (5' 2\")  1.575 m (5' 2\")   Weight (lb) 160 158  164 164 164 164   BMI 29.26 kg/m2 28.9 kg/m2  30 kg/m2 30 kg/m2 30 kg/m2 30 kg/m2   BSA (m2) 1.78 m2 1.77 m2  1.8 m2 1.8 m2 1.8 m2 1.8 m2   Visit Report    Report  Report Report      Body mass index is 30 kg/m².    GENERAL: A/Ox3, NAD. Appears healthy, well nourished  PSYCHIATRIC: Mood stable, appropriate memory recall  EYES: EOM intact, no scleral icterus  CARDIAC: regular rate  LUNGS: Breathing non-labored  SKIN: no erythema, rashes, or ecchymoses     MUSCULOSKELETAL:  Laterality: left Knee Exam  - Alignment: neutral  - ROM:  0 - >130deg, mild medial joint line pain with full flexion  - Effusion: none  - Strength: knee extension and flexion 5/5, EHL/PF/DF motor intact  - Palpation: TTP along posteromedial joint line  - Stability: Anterior/Posterior stable, varus/valgus stable  - Gait: normal  - Hip Exam: flexion to 100+ degrees, full extension, internal/external rotation adequate, and no pain with log roll  - Special Tests: positive Karan with palpable click and pain at medial joint line, positive Thessaly with reproduced pain at medial joint    NEUROVASCULAR:  - Neurovascular " Status: sensation intact to light touch distally  - Capillary refill brisk at extremities, Bilateral dorsalis pedis pulse 2+      IMAGING:  Multiple views of the affected left knee(s) demonstrate: Mild to moderate medial compartment joint space narrowing and subchondral sclerosis.   X-rays were personally reviewed and interpreted by me.  Radiology reports were reviewed by me as well, if readily available at the time.        Mahamed Whitley DO  Attending Surgeon  [facility] Adult [facility] Surgery  [place], [place]

## 2024-07-08 ENCOUNTER — TELEPHONE (OUTPATIENT)
Dept: ORTHOPEDIC SURGERY | Facility: CLINIC | Age: 59
End: 2024-07-08
Payer: COMMERCIAL

## 2024-07-08 DIAGNOSIS — M17.12 PRIMARY OSTEOARTHRITIS OF LEFT KNEE: Primary | ICD-10-CM

## 2024-07-08 RX ORDER — NAPROXEN 500 MG/1
500 TABLET ORAL 2 TIMES DAILY
Qty: 60 TABLET | Refills: 0 | Status: SHIPPED | OUTPATIENT
Start: 2024-07-08 | End: 2024-08-07

## 2024-07-08 NOTE — TELEPHONE ENCOUNTER
Patient called in requesting a refill of naproxen, states she has no pills left. Seen in office on 5/9/24 for her left knee pain. Please advise.     Pharmacy: [facility] in [place]

## 2024-07-30 ENCOUNTER — APPOINTMENT (OUTPATIENT)
Dept: PRIMARY CARE | Facility: CLINIC | Age: 59
End: 2024-07-30
Payer: COMMERCIAL

## 2024-08-26 ENCOUNTER — APPOINTMENT (OUTPATIENT)
Dept: PRIMARY CARE | Facility: CLINIC | Age: 59
End: 2024-08-26
Payer: COMMERCIAL

## 2025-03-07 ENCOUNTER — APPOINTMENT (OUTPATIENT)
Dept: UROLOGY | Facility: CLINIC | Age: 60
End: 2025-03-07
Payer: COMMERCIAL